# Patient Record
Sex: FEMALE | Race: WHITE | Employment: STUDENT | URBAN - NONMETROPOLITAN AREA
[De-identification: names, ages, dates, MRNs, and addresses within clinical notes are randomized per-mention and may not be internally consistent; named-entity substitution may affect disease eponyms.]

---
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
## 2023-02-14 RX ORDER — SERTRALINE HYDROCHLORIDE 100 MG/1
TABLET, FILM COATED ORAL
Qty: 90 TABLET | OUTPATIENT
Start: 2023-02-14

## 2023-02-14 NOTE — TELEPHONE ENCOUNTER
Patient needs a medication check. Please call and schedule. If she has been on this medication and will not have enough to last her til then, I will send a refill. Please let me know, thanks!

## 2023-02-16 RX ORDER — SERTRALINE HYDROCHLORIDE 100 MG/1
TABLET, FILM COATED ORAL
Qty: 90 TABLET | Refills: 0 | Status: SHIPPED | OUTPATIENT
Start: 2023-02-16

## 2023-02-20 ENCOUNTER — TELEPHONE (OUTPATIENT)
Dept: FAMILY MEDICINE CLINIC | Age: 18
End: 2023-02-20

## 2023-03-01 RX ORDER — SERTRALINE HYDROCHLORIDE 100 MG/1
TABLET, FILM COATED ORAL
Qty: 90 TABLET | Refills: 0 | Status: CANCELLED | OUTPATIENT
Start: 2023-03-01

## 2023-03-01 NOTE — TELEPHONE ENCOUNTER
----- Message from Joselito Griffin sent at 3/1/2023  8:48 AM EST -----  Subject: Refill Request    QUESTIONS  Name of Medication? sertraline (ZOLOFT) 100 MG tablet  Patient-reported dosage and instructions? 100 MG  How many days do you have left? 45  Preferred Pharmacy? [facility] [id number]  Pharmacy phone number (if available)? [phone]  Additional Information for Provider? Pt of Dr. Cresencio Sheehan called to request   refill on medication. Prefers [address] [place] [address].  ---------------------------------------------------------------------------  --------------  Luis Felipe Vale INFO  What is the best way for the office to contact you? OK to leave message on   voicemail  Preferred Call Back Phone Number? [phone]  ---------------------------------------------------------------------------  --------------  SCRIPT ANSWERS  Relationship to Patient?  Self

## 2023-03-01 NOTE — TELEPHONE ENCOUNTER
Can you call and see which script patient needs. 100 mg was sent to the pharmacy on 2/15. Unclear if she needs the same  refilled. Thanks!

## 2023-03-07 NOTE — TELEPHONE ENCOUNTER
Pt scheduled for Med check tomorrow with Dr. Aurelia Hammond. Would like to discuss transferring care to [facility] Provider.

## 2023-03-08 ENCOUNTER — OFFICE VISIT (OUTPATIENT)
Dept: FAMILY MEDICINE CLINIC | Age: 18
End: 2023-03-08
Payer: COMMERCIAL

## 2023-03-08 VITALS
WEIGHT: 142 LBS | DIASTOLIC BLOOD PRESSURE: 75 MMHG | OXYGEN SATURATION: 97 % | HEIGHT: 62 IN | HEART RATE: 79 BPM | SYSTOLIC BLOOD PRESSURE: 116 MMHG | RESPIRATION RATE: 19 BRPM | BODY MASS INDEX: 26.13 KG/M2 | TEMPERATURE: 97.6 F

## 2023-03-08 DIAGNOSIS — F32.A DEPRESSION, UNSPECIFIED DEPRESSION TYPE: ICD-10-CM

## 2023-03-08 DIAGNOSIS — F41.9 ANXIETY: Primary | ICD-10-CM

## 2023-03-08 PROBLEM — H69.82 DYSFUNCTION OF LEFT EUSTACHIAN TUBE: Status: RESOLVED | Noted: 2018-11-06 | Resolved: 2023-03-08

## 2023-03-08 PROBLEM — H66.001 NON-RECURRENT ACUTE SUPPURATIVE OTITIS MEDIA OF RIGHT EAR WITHOUT SPONTANEOUS RUPTURE OF TYMPANIC MEMBRANE: Status: RESOLVED | Noted: 2020-03-11 | Resolved: 2023-03-08

## 2023-03-08 PROBLEM — H69.92 DYSFUNCTION OF LEFT EUSTACHIAN TUBE: Status: RESOLVED | Noted: 2018-11-06 | Resolved: 2023-03-08

## 2023-03-08 PROCEDURE — 99214 OFFICE O/P EST MOD 30 MIN: CPT | Performed by: PEDIATRICS

## 2023-03-08 RX ORDER — SERTRALINE HYDROCHLORIDE 100 MG/1
TABLET, FILM COATED ORAL
Qty: 90 TABLET | Refills: 3 | Status: SHIPPED | OUTPATIENT
Start: 2023-03-08

## 2023-03-08 ASSESSMENT — PATIENT HEALTH QUESTIONNAIRE - PHQ9
SUM OF ALL RESPONSES TO PHQ QUESTIONS 1-9: 10
SUM OF ALL RESPONSES TO PHQ9 QUESTIONS 1 & 2: 3
10. IF YOU CHECKED OFF ANY PROBLEMS, HOW DIFFICULT HAVE THESE PROBLEMS MADE IT FOR YOU TO DO YOUR WORK, TAKE CARE OF THINGS AT HOME, OR GET ALONG WITH OTHER PEOPLE: 1
3. TROUBLE FALLING OR STAYING ASLEEP: 1
8. MOVING OR SPEAKING SO SLOWLY THAT OTHER PEOPLE COULD HAVE NOTICED. OR THE OPPOSITE, BEING SO FIGETY OR RESTLESS THAT YOU HAVE BEEN MOVING AROUND A LOT MORE THAN USUAL: 1
SUM OF ALL RESPONSES TO PHQ QUESTIONS 1-9: 10
5. POOR APPETITE OR OVEREATING: 1
9. THOUGHTS THAT YOU WOULD BE BETTER OFF DEAD, OR OF HURTING YOURSELF: 0
SUM OF ALL RESPONSES TO PHQ QUESTIONS 1-9: 10
6. FEELING BAD ABOUT YOURSELF - OR THAT YOU ARE A FAILURE OR HAVE LET YOURSELF OR YOUR FAMILY DOWN: 1
SUM OF ALL RESPONSES TO PHQ QUESTIONS 1-9: 10
4. FEELING TIRED OR HAVING LITTLE ENERGY: 1
2. FEELING DOWN, DEPRESSED OR HOPELESS: 1
1. LITTLE INTEREST OR PLEASURE IN DOING THINGS: 2
7. TROUBLE CONCENTRATING ON THINGS, SUCH AS READING THE NEWSPAPER OR WATCHING TELEVISION: 2

## 2023-03-08 ASSESSMENT — ENCOUNTER SYMPTOMS
GASTROINTESTINAL NEGATIVE: 1
RESPIRATORY NEGATIVE: 1

## 2023-03-08 NOTE — PROGRESS NOTES
ASSESSMENT:         1. Anxiety    2. Depression, unspecified depression type      Doing well on SSRI, no side effects of concern, no safety concerns     PLAN:     Have a set bedtime and routine   Bedtime and wake up time should be about the same time on school and non-school nights   Make the hour before bed shared quiet time, avoid high-energy or stimulating activities (TV, tablet, smartphone) just before bed   Avoid product containing caffeine in the evening (soda, coffee, tea, chocolate)   Regular exercise outside in daylight whenever possible   Use bed only for sleeping   No television in the bedroom     Would like to continue Zoloft 100 mg   Can consider weaning -would like to hold off until the summer   Monitor closely for medication effectiveness, duration, and side effects of concern. Return in 3-6 months for medication followup and monitoring of growth chart, sooner w/ academic or behavior concerns, immediately w/ safety concerns. More than 30 min spent in record review, patient face to face time with history, exam and coordination of care of care      Shawnmarvin Reis was seen today for other. Diagnoses and all orders for this visit:    Anxiety    Depression, unspecified depression type    Other orders  -     sertraline (ZOLOFT) 100 MG tablet; TAKE ONE TABLET BY MOUTH DAILY        No follow-ups on file.     SUBJECTIVE:      Chief Complaint   Patient presents with    Other     Med check, no concerns        HPI: Valaria Severe is a 21 y.o. female here today for medication check for anxiety     Medication: Zoloft 100 mg    Adverse Effects:  denies    Compliance:  good     Appetite:  no changes     Sleep:  7.5 hours a night, does nap during the day     Academics: freshman year at [employer], major in marketing, might switch to hospitality     Harmful thoughts:  no safety concerns     Counseling :  no, looking into seeing someone on campus     Social: started work at [employer] for the past couple weeks, trying to get into a routine       /75 (Site: Right Upper Arm, Position: Sitting, Cuff Size: Child)   Pulse 79   Temp 97.6 °F (36.4 °C) (Temporal)   Resp 19   Ht 5' 2\" (1.575 m)   Wt 142 lb (64.4 kg)   SpO2 97%   BMI 25.97 kg/m²     Allergies   Allergen Reactions    Latex Rash    Penicillins Hives       No current outpatient medications on file prior to visit. No current facility-administered medications on file prior to visit. Past Medical History:   Diagnosis Date    Dysfunction of left eustachian tube 11/6/2018    Mononucleosis 2015    Non-recurrent acute suppurative otitis media of right ear without spontaneous rupture of tympanic membrane 3/11/2020       Family History   Problem Relation Age of Onset    No Known Problems Mother     No Known Problems Father        Review of Systems   Constitutional: Negative. HENT: Negative. Respiratory: Negative. Cardiovascular: Negative. Gastrointestinal: Negative. Psychiatric/Behavioral:  The patient is nervous/anxious. OBJECTIVE:         Physical Exam  Vitals and nursing note reviewed. Constitutional:       General: She is not in acute distress. Appearance: She is well-developed. HENT:      Nose: Nose normal.   Eyes:      Conjunctiva/sclera: Conjunctivae normal.      Pupils: Pupils are equal, round, and reactive to light. Cardiovascular:      Rate and Rhythm: Normal rate and regular rhythm. Heart sounds: Normal heart sounds. Pulmonary:      Effort: Pulmonary effort is normal.      Breath sounds: Normal breath sounds. Abdominal:      Palpations: Abdomen is soft. Tenderness: There is no abdominal tenderness. Musculoskeletal:      Cervical back: Neck supple. Lymphadenopathy:      Cervical: No cervical adenopathy. Skin:     General: Skin is warm and dry. Coloration: Skin is not pale. Findings: No erythema or rash.

## 2024-03-06 ENCOUNTER — HOSPITAL ENCOUNTER (OUTPATIENT)
Age: 19
Discharge: HOME | End: 2024-03-06
Payer: COMMERCIAL

## 2024-03-06 ENCOUNTER — OFFICE VISIT (OUTPATIENT)
Dept: INTERNAL MEDICINE CLINIC | Age: 19
End: 2024-03-06
Payer: COMMERCIAL

## 2024-03-06 VITALS
DIASTOLIC BLOOD PRESSURE: 70 MMHG | OXYGEN SATURATION: 99 % | WEIGHT: 148 LBS | HEART RATE: 69 BPM | SYSTOLIC BLOOD PRESSURE: 122 MMHG | HEIGHT: 62 IN | BODY MASS INDEX: 27.23 KG/M2

## 2024-03-06 DIAGNOSIS — N94.6 DYSMENORRHEA: ICD-10-CM

## 2024-03-06 DIAGNOSIS — M25.532 PAIN OF BOTH WRIST JOINTS: ICD-10-CM

## 2024-03-06 DIAGNOSIS — Z83.2 FAMILY HISTORY OF AUTOIMMUNE DISORDER: ICD-10-CM

## 2024-03-06 DIAGNOSIS — F41.9 ANXIETY: ICD-10-CM

## 2024-03-06 DIAGNOSIS — Z76.89 ENCOUNTER TO ESTABLISH CARE: Primary | ICD-10-CM

## 2024-03-06 DIAGNOSIS — R53.82 CHRONIC FATIGUE: ICD-10-CM

## 2024-03-06 DIAGNOSIS — M25.531 PAIN OF BOTH WRIST JOINTS: ICD-10-CM

## 2024-03-06 DIAGNOSIS — F32.89 OTHER DEPRESSION: ICD-10-CM

## 2024-03-06 LAB
25(OH)D3 SERPL-MCNC: 18.94 NG/ML
ALP BLD-CCNC: 86 IU/L (ref 40–128)
ALT SERPL-CCNC: 10 U/L (ref 10–40)
ANION GAP SERPL CALCULATED.3IONS-SCNC: 11 MMOL/L (ref 7–16)
AST SERPL-CCNC: 16 IU/L (ref 15–37)
BASOPHILS ABSOLUTE: 0.1 K/CU MM
BASOPHILS RELATIVE PERCENT: 1.1 % (ref 0–1)
BUN SERPL-MCNC: 4 MG/DL (ref 6–23)
CALCIUM SERPL-MCNC: 9.4 MG/DL (ref 8.3–10.6)
CHLORIDE BLD-SCNC: 103 MMOL/L (ref 99–110)
CO2: 26 MMOL/L (ref 21–32)
CREAT SERPL-MCNC: 0.6 MG/DL (ref 0.6–1.1)
CRP SERPL HS-MCNC: 3 MG/L
DIFFERENTIAL TYPE: ABNORMAL
EOSINOPHILS ABSOLUTE: 0.1 K/CU MM
EOSINOPHILS RELATIVE PERCENT: 1.5 % (ref 0–3)
ERYTHROCYTE SEDIMENTATION RATE: 1 MM/HR (ref 0–20)
ESTIMATED AVERAGE GLUCOSE: 105 MG/DL
GFR SERPL CREATININE-BSD FRML MDRD: >60 ML/MIN/1.73M2
HBA1C MFR BLD: 5.3 % (ref 4.2–6.3)
HCT VFR BLD CALC: 42.7 % (ref 37–47)
HEMOGLOBIN: 13.3 GM/DL (ref 12.5–16)
IMMATURE NEUTROPHIL %: 0.2 % (ref 0–0.43)
LYMPHOCYTES ABSOLUTE: 2.8 K/CU MM
LYMPHOCYTES RELATIVE PERCENT: 41.7 % (ref 24–44)
MCH RBC QN AUTO: 27.9 PG (ref 27–31)
MCHC RBC AUTO-ENTMCNC: 31.1 % (ref 32–36)
MCV RBC AUTO: 89.5 FL (ref 78–100)
MONOCYTES ABSOLUTE: 0.7 K/CU MM
MONOCYTES RELATIVE PERCENT: 9.9 % (ref 0–4)
NUCLEATED RBC %: 0 %
PDW BLD-RTO: 12.3 % (ref 11.7–14.9)
PLATELET # BLD: 417 K/CU MM (ref 140–440)
PMV BLD AUTO: 10.4 FL (ref 7.5–11.1)
RBC # BLD: 4.77 M/CU MM (ref 4.2–5.4)
SEGMENTED NEUTROPHILS ABSOLUTE COUNT: 3 K/CU MM
SEGMENTED NEUTROPHILS RELATIVE PERCENT: 45.6 % (ref 36–66)
SODIUM BLD-SCNC: 140 MMOL/L (ref 135–145)
T4 FREE SERPL-MCNC: 1.16 NG/DL (ref 0.9–1.8)
TOTAL IMMATURE NEUTOROPHIL: 0.01 K/CU MM
TOTAL NUCLEATED RBC: 0 K/CU MM
TOTAL PROTEIN: 7 GM/DL (ref 6.4–8.2)
TSH SERPL DL<=0.005 MIU/L-ACNC: 1.01 UIU/ML (ref 0.27–4.2)
WBC # BLD: 6.7 K/CU MM (ref 4–10.5)

## 2024-03-06 PROCEDURE — 82306 VITAMIN D 25 HYDROXY: CPT

## 2024-03-06 PROCEDURE — 99203 OFFICE O/P NEW LOW 30 MIN: CPT

## 2024-03-06 PROCEDURE — 86225 DNA ANTIBODY NATIVE: CPT

## 2024-03-06 PROCEDURE — 85025 COMPLETE CBC W/AUTO DIFF WBC: CPT

## 2024-03-06 PROCEDURE — 86038 ANTINUCLEAR ANTIBODIES: CPT

## 2024-03-06 PROCEDURE — 84439 ASSAY OF FREE THYROXINE: CPT

## 2024-03-06 PROCEDURE — 85652 RBC SED RATE AUTOMATED: CPT

## 2024-03-06 PROCEDURE — 36415 COLL VENOUS BLD VENIPUNCTURE: CPT

## 2024-03-06 PROCEDURE — 80053 COMPREHEN METABOLIC PANEL: CPT

## 2024-03-06 PROCEDURE — 83036 HEMOGLOBIN GLYCOSYLATED A1C: CPT

## 2024-03-06 PROCEDURE — 86140 C-REACTIVE PROTEIN: CPT

## 2024-03-06 PROCEDURE — 84443 ASSAY THYROID STIM HORMONE: CPT

## 2024-03-06 RX ORDER — DROSPIRENONE AND ESTETROL 3-14.2(28)
1 KIT ORAL DAILY
COMMUNITY
Start: 2023-12-27

## 2024-03-06 RX ORDER — SERTRALINE HYDROCHLORIDE 100 MG/1
TABLET, FILM COATED ORAL
Qty: 90 TABLET | Refills: 3 | Status: SHIPPED | OUTPATIENT
Start: 2024-03-06

## 2024-03-06 SDOH — ECONOMIC STABILITY: INCOME INSECURITY: HOW HARD IS IT FOR YOU TO PAY FOR THE VERY BASICS LIKE FOOD, HOUSING, MEDICAL CARE, AND HEATING?: NOT HARD AT ALL

## 2024-03-06 SDOH — ECONOMIC STABILITY: FOOD INSECURITY: WITHIN THE PAST 12 MONTHS, THE FOOD YOU BOUGHT JUST DIDN'T LAST AND YOU DIDN'T HAVE MONEY TO GET MORE.: NEVER TRUE

## 2024-03-06 SDOH — ECONOMIC STABILITY: FOOD INSECURITY: WITHIN THE PAST 12 MONTHS, YOU WORRIED THAT YOUR FOOD WOULD RUN OUT BEFORE YOU GOT MONEY TO BUY MORE.: NEVER TRUE

## 2024-03-06 SDOH — ECONOMIC STABILITY: HOUSING INSECURITY
IN THE LAST 12 MONTHS, WAS THERE A TIME WHEN YOU DID NOT HAVE A STEADY PLACE TO SLEEP OR SLEPT IN A SHELTER (INCLUDING NOW)?: NO

## 2024-03-06 ASSESSMENT — PATIENT HEALTH QUESTIONNAIRE - PHQ9
6. FEELING BAD ABOUT YOURSELF - OR THAT YOU ARE A FAILURE OR HAVE LET YOURSELF OR YOUR FAMILY DOWN: 1
SUM OF ALL RESPONSES TO PHQ9 QUESTIONS 1 & 2: 6
7. TROUBLE CONCENTRATING ON THINGS, SUCH AS READING THE NEWSPAPER OR WATCHING TELEVISION: 1
10. IF YOU CHECKED OFF ANY PROBLEMS, HOW DIFFICULT HAVE THESE PROBLEMS MADE IT FOR YOU TO DO YOUR WORK, TAKE CARE OF THINGS AT HOME, OR GET ALONG WITH OTHER PEOPLE: 0
3. TROUBLE FALLING OR STAYING ASLEEP: 2
SUM OF ALL RESPONSES TO PHQ QUESTIONS 1-9: 14
1. LITTLE INTEREST OR PLEASURE IN DOING THINGS: 3
9. THOUGHTS THAT YOU WOULD BE BETTER OFF DEAD, OR OF HURTING YOURSELF: 0
5. POOR APPETITE OR OVEREATING: 1
2. FEELING DOWN, DEPRESSED OR HOPELESS: 3
SUM OF ALL RESPONSES TO PHQ QUESTIONS 1-9: 14
8. MOVING OR SPEAKING SO SLOWLY THAT OTHER PEOPLE COULD HAVE NOTICED. OR THE OPPOSITE, BEING SO FIGETY OR RESTLESS THAT YOU HAVE BEEN MOVING AROUND A LOT MORE THAN USUAL: 1
4. FEELING TIRED OR HAVING LITTLE ENERGY: 2

## 2024-03-06 NOTE — PROGRESS NOTES
Subjective:      Chief Complaint   Patient presents with    New Patient     HPI:  Jessica Motley is a 18 y.o. female who presents today for establishing care as a new patient.    Applying to [employer] program, will hear this month.     Taking zoloft since 8th grade, no adverse side effects. Has been on current dose for a long time and seems to control symptoms well.     Seeing [facility] in [place] [facility]. Started on [employer].    Has been feeling similar symptoms to her mom when she was diagnosed with hashimoto's. Dizzy spells. Has been evaluated with cardiology was told to increase protein and salt in diet.     Has dysmenorrhea and periods are severe. Usually week before period is the worst.     Harder to hear out of left ear.     Mono diagnosed in 6th grade.     Family hx: thyroid (hashimoto's)    Past Medical History:   Diagnosis Date    Dysfunction of left eustachian tube 11/6/2018    Mononucleosis 2015    Non-recurrent acute suppurative otitis media of right ear without spontaneous rupture of tympanic membrane 3/11/2020      Past Surgical History:   Procedure Laterality Date    TONSILLECTOMY AND ADENOIDECTOMY  2006     Social History     Tobacco Use    Smoking status: Never    Smokeless tobacco: Never   Substance Use Topics    Alcohol use: Not on file      Review of Systems   Constitutional:  Negative for activity change, appetite change, chills, diaphoresis, fatigue, fever and unexpected weight change.   HENT:  Negative for congestion, facial swelling, rhinorrhea, sore throat and trouble swallowing.    Eyes:  Negative for pain, discharge, redness and visual disturbance.   Respiratory:  Negative for cough, choking, chest tightness, shortness of breath, wheezing and stridor.    Cardiovascular:  Negative for chest pain, palpitations and leg swelling.   Gastrointestinal:  Negative for abdominal pain, constipation, diarrhea, nausea and vomiting.   Genitourinary:  Negative for difficulty

## 2024-03-09 LAB — DSDNA IGG SER QL IA: 3 IU (ref 0–24)

## 2024-03-10 LAB
ANTINUCLEAR ANTIBODY, HEP-2, IGG: NORMAL
INTERPRETATION: NORMAL

## 2024-03-11 DIAGNOSIS — E55.9 VITAMIN D DEFICIENCY: Primary | ICD-10-CM

## 2024-03-11 RX ORDER — ERGOCALCIFEROL 1.25 MG/1
50000 CAPSULE ORAL WEEKLY
Qty: 12 CAPSULE | Refills: 1 | Status: SHIPPED | OUTPATIENT
Start: 2024-03-11

## 2024-05-20 ENCOUNTER — TELEPHONE (OUTPATIENT)
Dept: INTERNAL MEDICINE CLINIC | Age: 19
End: 2024-05-20

## 2024-05-20 NOTE — TELEPHONE ENCOUNTER
----- Message from Cruzito Fuentes sent at 5/20/2024 10:28 AM EDT -----  Regarding: [facility] Referral Request  [facility] Referral Request    Reason for referral request: Specialty Provider    Specialist/Lab/Test patient is requesting (if known): Psychologist     Specialist Phone Number (if applicable):    Additional Information Pt wants to have a referral regarding with her to see a psychologist  --------------------------------------------------------------------------------------------------------------------------    Relationship to Patient: Self     Call Back Information: OK to leave message on voicemail  Preferred Call Back Number: Phone [phone]

## 2024-05-20 NOTE — TELEPHONE ENCOUNTER
Got a call from the patient regarding immunizations she is needing for school, informed her the only vaccination done in the office is the flue vaccine. Informed her to contact health department, said she will give them a call.    Patient also asked if pcp could place referral for psychologist. Stated they spoke about it a little at her last appointment.

## 2024-06-27 DIAGNOSIS — Z83.49 FAMILY HISTORY OF THYROID DISEASE: ICD-10-CM

## 2024-06-27 DIAGNOSIS — Z83.2 FAMILY HISTORY OF AUTOIMMUNE DISORDER: Primary | ICD-10-CM

## 2024-06-27 DIAGNOSIS — R53.82 CHRONIC FATIGUE: ICD-10-CM

## 2024-07-01 ENCOUNTER — HOSPITAL ENCOUNTER (OUTPATIENT)
Dept: ULTRASOUND IMAGING | Age: 19
Discharge: HOME | End: 2024-07-01
Payer: COMMERCIAL

## 2024-07-01 DIAGNOSIS — Z83.2 FAMILY HISTORY OF AUTOIMMUNE DISORDER: ICD-10-CM

## 2024-07-01 DIAGNOSIS — Z83.49 FAMILY HISTORY OF THYROID DISEASE: ICD-10-CM

## 2024-07-01 DIAGNOSIS — R53.82 CHRONIC FATIGUE: ICD-10-CM

## 2024-07-01 PROCEDURE — 76536 US EXAM OF HEAD AND NECK: CPT

## 2024-07-23 ENCOUNTER — PATIENT MESSAGE (OUTPATIENT)
Dept: INTERNAL MEDICINE CLINIC | Age: 19
End: 2024-07-23

## 2024-07-23 ENCOUNTER — OFFICE VISIT (OUTPATIENT)
Dept: INTERNAL MEDICINE CLINIC | Age: 19
End: 2024-07-23
Payer: COMMERCIAL

## 2024-07-23 VITALS
SYSTOLIC BLOOD PRESSURE: 100 MMHG | BODY MASS INDEX: 26.7 KG/M2 | WEIGHT: 146 LBS | HEART RATE: 73 BPM | OXYGEN SATURATION: 98 % | DIASTOLIC BLOOD PRESSURE: 64 MMHG

## 2024-07-23 DIAGNOSIS — R41.840 IMPAIRED CONCENTRATION: ICD-10-CM

## 2024-07-23 DIAGNOSIS — F32.89 OTHER DEPRESSION: ICD-10-CM

## 2024-07-23 DIAGNOSIS — F41.9 ANXIETY: Primary | ICD-10-CM

## 2024-07-23 PROCEDURE — 99213 OFFICE O/P EST LOW 20 MIN: CPT

## 2024-07-23 ASSESSMENT — ENCOUNTER SYMPTOMS
DIARRHEA: 0
RHINORRHEA: 0
COUGH: 0
STRIDOR: 0
CONSTIPATION: 0
CHOKING: 0
VOMITING: 0
SORE THROAT: 0
SHORTNESS OF BREATH: 0
NAUSEA: 0
FACIAL SWELLING: 0
COLOR CHANGE: 0
WHEEZING: 0
TROUBLE SWALLOWING: 0
ABDOMINAL PAIN: 0
CHEST TIGHTNESS: 0
EYE PAIN: 0
EYE REDNESS: 0
EYE DISCHARGE: 0

## 2024-07-23 ASSESSMENT — VISUAL ACUITY: OU: 1

## 2024-07-23 NOTE — PROGRESS NOTES
Neurological:  Negative for dizziness, syncope, weakness, light-headedness, numbness and headaches.   Psychiatric/Behavioral:  Positive for decreased concentration and dysphoric mood. Negative for agitation and behavioral problems. The patient is nervous/anxious.       Prior to Visit Medications    Medication Sig Taking? Authorizing Provider   vitamin D (ERGOCALCIFEROL) 1.25 MG (90507 UT) CAPS capsule Take 1 capsule by mouth once a week Yes Valerie Garay APRN - CNP   sertraline (ZOLOFT) 100 MG tablet TAKE ONE TABLET BY MOUTH DAILY Yes Valerie Garay APRN - CNP   NEXTSTELLIS 3-14.2 MG TABS Take 1 tablet by mouth daily Yes Provider, MD Kendall        Objective:      /64   Pulse 73   Wt 66.2 kg (146 lb)   SpO2 98%   BMI 26.70 kg/m²    Physical Exam  Vitals reviewed.   Constitutional:       General: She is awake. She is not in acute distress.     Appearance: Normal appearance. She is well-developed. She is not ill-appearing or toxic-appearing.   HENT:      Head: Normocephalic.      Jaw: There is normal jaw occlusion.      Right Ear: Tympanic membrane, ear canal and external ear normal.      Left Ear: Tympanic membrane, ear canal and external ear normal.      Nose: Nose normal.      Mouth/Throat:      Mouth: Mucous membranes are moist.      Pharynx: Oropharynx is clear.   Eyes:      General: Lids are normal. Vision grossly intact. Gaze aligned appropriately.      Extraocular Movements: Extraocular movements intact.      Conjunctiva/sclera: Conjunctivae normal.      Pupils: Pupils are equal, round, and reactive to light.   Cardiovascular:      Rate and Rhythm: Normal rate and regular rhythm.      Pulses: Normal pulses.      Heart sounds: Normal heart sounds, S1 normal and S2 normal.   Pulmonary:      Effort: Pulmonary effort is normal. No respiratory distress.      Breath sounds: Normal breath sounds.   Abdominal:      General: Bowel sounds are normal.      Palpations: Abdomen is soft.      Tenderness:

## 2024-08-31 DIAGNOSIS — E55.9 VITAMIN D DEFICIENCY: ICD-10-CM

## 2024-09-03 RX ORDER — ERGOCALCIFEROL 1.25 MG/1
50000 CAPSULE, LIQUID FILLED ORAL WEEKLY
Qty: 12 CAPSULE | Refills: 1 | Status: SHIPPED | OUTPATIENT
Start: 2024-09-03

## 2024-09-30 ENCOUNTER — OFFICE VISIT (OUTPATIENT)
Dept: PSYCHOLOGY | Age: 19
End: 2024-09-30
Payer: COMMERCIAL

## 2024-09-30 DIAGNOSIS — F90.2 ATTENTION DEFICIT HYPERACTIVITY DISORDER (ADHD), COMBINED TYPE: ICD-10-CM

## 2024-09-30 DIAGNOSIS — F33.0 MILD EPISODE OF RECURRENT MAJOR DEPRESSIVE DISORDER (HCC): Primary | ICD-10-CM

## 2024-09-30 PROCEDURE — 90791 PSYCH DIAGNOSTIC EVALUATION: CPT | Performed by: PSYCHOLOGIST

## 2024-09-30 NOTE — PROGRESS NOTES
Behavioral Health Consultation  Patient seen by Ingrid Vargas, Psychology Trainee  under the training supervision of Andrew Lucero Psy.D.    Time spent with Patient: 35 minutes  Visit number: 1  Reason for Consult:  depression and anxiety  Referring Provider: No referring provider defined for this encounter.    Informed consent:  Pt provided informed consent for the behavioral health program. Discussed with patient model of service to include the limits of confidentiality (i.e. abuse reporting, suicide intervention, etc.) and focused intervention approach. Pt indicated understanding.    S:  ----------------------------------------------------------------------------------------------------------------------    Presenting problem:  depression and anxiety  Pt reported to session after her parents suggested Pt go to therapy as many of her family members experience MH struggles. Pt reported sleeping and eating \"a lot\" - 9hours at night and 1-3hr nap in the afternoon. Reported a history of anger issues but denied struggles currently. Her current anger triggers are when noises do not coincide well (frequency, sound, volume). Pt reported moments where she paces around her room and talks through hypothetical and past experiences as an outlet to relax. She reported being oriented x4 when she is acting out the experiences. She reported remaining emotionally and physically stressed when she does not act out experiences but has the urge to. She reported her urge act out experiences can also be alleviated by physical activity.    Social:   Pt reported growing up with bio mom, dad, brother (3years younger). She described having \"okay\" relationships with her family (clashing heads with mom and not getting along with brother). She reported being closest with dad. Pt reported that when she finds a new hobby, her mom becomes more upset and \"takes it as a personal slight\". Reported brother was diagnosed with ASD and Pt finds

## 2024-10-14 ENCOUNTER — OFFICE VISIT (OUTPATIENT)
Dept: PSYCHOLOGY | Age: 19
End: 2024-10-14
Payer: COMMERCIAL

## 2024-10-14 DIAGNOSIS — F90.2 ATTENTION DEFICIT HYPERACTIVITY DISORDER (ADHD), COMBINED TYPE: ICD-10-CM

## 2024-10-14 DIAGNOSIS — F33.0 MILD EPISODE OF RECURRENT MAJOR DEPRESSIVE DISORDER (HCC): Primary | ICD-10-CM

## 2024-10-14 PROCEDURE — 90832 PSYTX W PT 30 MINUTES: CPT | Performed by: PSYCHOLOGIST

## 2024-10-14 NOTE — PROGRESS NOTES
oriented to person, place, time, and general circumstances  Appearance and behavior:  alert, cooperative, mild distress  Speech:  normal rate, normal volume, and well articulated  Mood: anxious   Thought Content:  helplessness, worthlessness, and intrusive thoughts  Thought Process:  linear, goal directed, and coherent  Interest/Pleasure: Loss of Pleasure/Fun  Sleep disturbance: Yes  Motivation: Poor  Energy: Tired/Fatigued  Morbid ideation No  Suicide Assessment: no suicidal ideation        3/6/2024     2:22 PM 3/8/2023     3:02 PM 9/21/2021     9:20 AM 3/10/2020     4:26 PM 2/18/2020     3:19 PM 11/18/2019     2:12 PM 8/6/2019     2:18 PM   PHQ Scores   PHQ2 Score 6 3 3 0 2 2 2   PHQ9 Score 14 10 17 0 12 13 13     Interpretation of Total Score Depression Severity: 1-4 = Minimal depression, 5-9 = Mild depression, 10-14 = Moderate depression, 15-19 = Moderately severe depression, 20-27 = Severe depression         No data to display              Interpretation of LYSSA-7 score: 5-9 = mild anxiety, 10-14 = moderate anxiety, 15+ = severe anxiety. Recommend referral to behavioral health for scores 10 or greater.     A:  ----------------------------------------------------------------------------------------------------------------------  Diagnosis:    1. Mild episode of recurrent major depressive disorder (HCC)    2. Attention deficit hyperactivity disorder (ADHD), combined type       P:  ----------------------------------------------------------------------------------------------------------------------     [x]Discussed potential treatments for   1. Mild episode of recurrent major depressive disorder (HCC)    2. Attention deficit hyperactivity disorder (ADHD), combined type       [x]Conducted functional assessment  [x]Established rapport  [x]Supportive interventions   [x][place]-setting to identify pt's primary goals for [facility] visit / overall health  [x]Provided psychoeducation/handout on   1. Mild episode of recurrent

## 2024-10-28 ENCOUNTER — OFFICE VISIT (OUTPATIENT)
Dept: PSYCHOLOGY | Age: 19
End: 2024-10-28
Payer: COMMERCIAL

## 2024-10-28 DIAGNOSIS — F33.0 MILD EPISODE OF RECURRENT MAJOR DEPRESSIVE DISORDER (HCC): Primary | ICD-10-CM

## 2024-10-28 DIAGNOSIS — F90.2 ATTENTION DEFICIT HYPERACTIVITY DISORDER (ADHD), COMBINED TYPE: ICD-10-CM

## 2024-10-28 PROCEDURE — 90832 PSYTX W PT 30 MINUTES: CPT | Performed by: PSYCHOLOGIST

## 2024-10-28 NOTE — PROGRESS NOTES
Behavioral Health Consultation  Patient seen by Ingrid Vargas, Psychology Trainee  under the training supervision of Andrew Lucero Psy.D.    Time spent with Patient: 30 minutes  Visit number: 3  Reason for Consult:  depression and anxiety  Referring Provider: Valerie Garay APRN - CNP  No address on file    Informed consent:  Pt provided informed consent for the behavioral health program. Discussed with patient model of service to include the limits of confidentiality (i.e. abuse reporting, suicide intervention, etc.) and focused intervention approach. Pt indicated understanding.    S:  ----------------------------------------------------------------------------------------------------------------------    Presenting problem:  depression and anxiety  Pt explained she had a weekend of ups and downs. She went to [place], [place] with her family to visit [place] in [place]. She described not enjoying her time on the trip because she was uncomfortable with her family and anxious in crowds. Additionally, she said her brother was intentionally irritating Pt, which frustrated Pt. After vocalizing her frustration with her brother, Pt noted her mom stepped in and criticized Pt for voicing her frustration. Pt explained she received validation and support from father, which made Pt feel better. She explained her family dog ran away while the family was on the trip, which cut the trip short. Pt reported she had a panic attack in the car on the way home, worried her family dog may have been killed by a car. She explained her physical symptoms stopped once her dog returned back to the family home. She described sleeping with her dog that night and was able to sleep in.     Pt endorsed depressed mood, poor sleep patterns, poor diet, loss of pleasure in previous hobbies, trouble sleeping, and excessive worry about judgement from others.

## 2024-11-11 ENCOUNTER — OFFICE VISIT (OUTPATIENT)
Dept: PSYCHOLOGY | Age: 19
End: 2024-11-11
Payer: COMMERCIAL

## 2024-11-11 DIAGNOSIS — F90.2 ATTENTION DEFICIT HYPERACTIVITY DISORDER (ADHD), COMBINED TYPE: ICD-10-CM

## 2024-11-11 DIAGNOSIS — F33.0 MILD EPISODE OF RECURRENT MAJOR DEPRESSIVE DISORDER (HCC): Primary | ICD-10-CM

## 2024-11-11 PROCEDURE — 90832 PSYTX W PT 30 MINUTES: CPT | Performed by: PSYCHOLOGIST

## 2024-11-25 ENCOUNTER — OFFICE VISIT (OUTPATIENT)
Dept: PSYCHOLOGY | Age: 19
End: 2024-11-25
Payer: COMMERCIAL

## 2024-11-25 DIAGNOSIS — F33.0 MILD EPISODE OF RECURRENT MAJOR DEPRESSIVE DISORDER (HCC): Primary | ICD-10-CM

## 2024-11-25 DIAGNOSIS — F90.2 ATTENTION DEFICIT HYPERACTIVITY DISORDER (ADHD), COMBINED TYPE: ICD-10-CM

## 2024-11-25 PROCEDURE — 90832 PSYTX W PT 30 MINUTES: CPT | Performed by: PSYCHOLOGIST

## 2024-11-25 NOTE — PROGRESS NOTES
Behavioral Health Consultation  Patient seen by Ingrid Vargas, Psychology Trainee  under the training supervision of Andrew Lucero Psy.D.    Time spent with Patient: 30 minutes  Visit number: 5  Reason for Consult:  depression and anxiety  Referring Provider: No referring provider defined for this encounter.    Informed consent:  Pt provided informed consent for the behavioral health program. Discussed with patient model of service to include the limits of confidentiality (i.e. abuse reporting, suicide intervention, etc.) and focused intervention approach. Pt indicated understanding.    S:  ----------------------------------------------------------------------------------------------------------------------    Presenting problem:  depression and anxiety  Pt explained she is getting ready for finals and is feeling very stressed because her grades are close to the threshold of passing. Pt explained she is trying to remind herself to perform the best she can to ensure she passes. Pt described feeling upset that classes have been canceled before Thanksgiving, which she feels frustrated by because she wanted the class to help prepare for finals. To make up for lost class time, Pt explained setting up time with her friends to study for their finals that are the first two weeks of December. Pt described the ways she best studies and plans to utilize her strategies to prepare for finals.    Pt reported her family is planning on going to her maternal grandmother's house for Thanksgiving. She described that her grandmother can be mean, which was evident in a recent shopping trip where her grandmother made rude comments to Pt. Pt discussed strategies she utilizes to prepare for / get through the holidays mentally, emotionally, and physically.    Pt also discussed a friend break up that she had about two years ago. She explained Pt saw the ex-friend recently at the store, which caught Pt off guard. Pt described

## 2024-12-12 ENCOUNTER — TELEPHONE (OUTPATIENT)
Dept: INTERNAL MEDICINE CLINIC | Age: 19
End: 2024-12-12

## 2024-12-12 NOTE — TELEPHONE ENCOUNTER
I'm still unsure of what specific antibody they are wanting her tested for.  There is an antibody screen, but that is generally for pre op for possible transfusion purposes, or for prenatal care (I don't think this is what they are looking for).  Can we please try to get some details, or find the epic order they are wanting? Thanks.

## 2024-12-12 NOTE — TELEPHONE ENCOUNTER
I'm not sure what an immunity blood level test is.  Is this a requirement from the school? If so, please have her obtain the exact lab they are requiring and I can order them- thanks.

## 2024-12-12 NOTE — TELEPHONE ENCOUNTER
Patient is in [employer] and is asking for an order for Immunity Blood Level test. Any questions, please call [phone]

## 2025-01-23 ENCOUNTER — OFFICE VISIT (OUTPATIENT)
Dept: INTERNAL MEDICINE CLINIC | Age: 20
End: 2025-01-23

## 2025-01-23 VITALS
OXYGEN SATURATION: 98 % | DIASTOLIC BLOOD PRESSURE: 68 MMHG | WEIGHT: 155 LBS | SYSTOLIC BLOOD PRESSURE: 102 MMHG | BODY MASS INDEX: 28.35 KG/M2 | HEART RATE: 95 BPM

## 2025-01-23 DIAGNOSIS — Z00.00 GENERAL MEDICAL EXAM: ICD-10-CM

## 2025-01-23 DIAGNOSIS — F90.2 ATTENTION DEFICIT HYPERACTIVITY DISORDER (ADHD), COMBINED TYPE: ICD-10-CM

## 2025-01-23 DIAGNOSIS — F41.9 ANXIETY: ICD-10-CM

## 2025-01-23 DIAGNOSIS — F32.89 OTHER DEPRESSION: Primary | ICD-10-CM

## 2025-01-23 DIAGNOSIS — R76.8 POSITIVE ANA (ANTINUCLEAR ANTIBODY): ICD-10-CM

## 2025-01-23 DIAGNOSIS — E55.9 VITAMIN D DEFICIENCY: ICD-10-CM

## 2025-01-23 RX ORDER — DEXTROAMPHETAMINE SACCHARATE, AMPHETAMINE ASPARTATE MONOHYDRATE, DEXTROAMPHETAMINE SULFATE AND AMPHETAMINE SULFATE 2.5; 2.5; 2.5; 2.5 MG/1; MG/1; MG/1; MG/1
10 CAPSULE, EXTENDED RELEASE ORAL DAILY
Qty: 30 CAPSULE | Refills: 0 | Status: SHIPPED | OUTPATIENT
Start: 2025-01-23 | End: 2025-02-22

## 2025-01-23 RX ORDER — SERTRALINE HYDROCHLORIDE 100 MG/1
TABLET, FILM COATED ORAL
Qty: 90 TABLET | Refills: 3 | Status: SHIPPED | OUTPATIENT
Start: 2025-01-23

## 2025-01-23 SDOH — ECONOMIC STABILITY: FOOD INSECURITY: WITHIN THE PAST 12 MONTHS, YOU WORRIED THAT YOUR FOOD WOULD RUN OUT BEFORE YOU GOT MONEY TO BUY MORE.: NEVER TRUE

## 2025-01-23 SDOH — ECONOMIC STABILITY: FOOD INSECURITY: WITHIN THE PAST 12 MONTHS, THE FOOD YOU BOUGHT JUST DIDN'T LAST AND YOU DIDN'T HAVE MONEY TO GET MORE.: NEVER TRUE

## 2025-01-23 ASSESSMENT — ENCOUNTER SYMPTOMS
DIARRHEA: 0
SORE THROAT: 0
CONSTIPATION: 0
COLOR CHANGE: 0
SHORTNESS OF BREATH: 0
ABDOMINAL PAIN: 0
CHEST TIGHTNESS: 0
VOMITING: 0

## 2025-01-23 NOTE — PROGRESS NOTES
Subjective:      Chief Complaint   Patient presents with    Establish Care     New to provider-former patient of Valerie       HPI:  Jessica Motley is a 19 y.o. female who presents today to establish care with new provider and for management of chronic medical conditions as below.     Depression/anxiety:  Takes zoloft 100mg daily, states symptoms are well controlled with medication.     Has been following with psychology.  States she has been evaluated for ADHD and was advised to follow up with PCP for treatment.  Reports she has had symptoms since childhood but was never evaluated for it.  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      Denies any history of cardiac issues.    Does have some issues with chronic joint pain (mostly ankle, wrist, elbow) for which she had labs completed but was never contacted about results. Is unsure about family history of autoimmune disorders.     Sees gynecology, is taking OCP daily.     Was started in vitamin D supplementation, but has not been taking as she has difficulty remembering a weekly pill.    Otherwise no concerns today.         Past Medical History:   Diagnosis Date    Anxiety     Depression     Dysfunction of left eustachian tube 11/06/2018    Mononucleosis 2015    Non-recurrent acute suppurative otitis media of right ear without spontaneous rupture of tympanic membrane 03/11/2020        Past Surgical History:   Procedure Laterality Date    TONSILLECTOMY AND ADENOIDECTOMY  2006       Social History     Tobacco Use    Smoking status: Never    Smokeless tobacco: Never   Substance Use Topics    Alcohol use: Never        Review of Systems   Constitutional:  Negative for activity change, appetite change, chills, fever and unexpected weight change.   HENT:  Negative for congestion and sore throat.    Respiratory:  Negative for chest tightness and

## 2025-02-20 ENCOUNTER — OFFICE VISIT (OUTPATIENT)
Dept: INTERNAL MEDICINE CLINIC | Age: 20
End: 2025-02-20

## 2025-02-20 VITALS
BODY MASS INDEX: 27.98 KG/M2 | DIASTOLIC BLOOD PRESSURE: 72 MMHG | WEIGHT: 153 LBS | SYSTOLIC BLOOD PRESSURE: 104 MMHG | HEART RATE: 76 BPM | OXYGEN SATURATION: 98 %

## 2025-02-20 DIAGNOSIS — E55.9 VITAMIN D DEFICIENCY: ICD-10-CM

## 2025-02-20 DIAGNOSIS — F90.8 OTHER SPECIFIED ATTENTION DEFICIT HYPERACTIVITY DISORDER (ADHD): Primary | ICD-10-CM

## 2025-02-20 DIAGNOSIS — F32.89 OTHER DEPRESSION: ICD-10-CM

## 2025-02-20 DIAGNOSIS — Z83.49 FAMILY HISTORY OF THYROID DISEASE: ICD-10-CM

## 2025-02-20 ASSESSMENT — PATIENT HEALTH QUESTIONNAIRE - PHQ9
1. LITTLE INTEREST OR PLEASURE IN DOING THINGS: SEVERAL DAYS
8. MOVING OR SPEAKING SO SLOWLY THAT OTHER PEOPLE COULD HAVE NOTICED. OR THE OPPOSITE, BEING SO FIGETY OR RESTLESS THAT YOU HAVE BEEN MOVING AROUND A LOT MORE THAN USUAL: SEVERAL DAYS
9. THOUGHTS THAT YOU WOULD BE BETTER OFF DEAD, OR OF HURTING YOURSELF: SEVERAL DAYS
SUM OF ALL RESPONSES TO PHQ QUESTIONS 1-9: 9
6. FEELING BAD ABOUT YOURSELF - OR THAT YOU ARE A FAILURE OR HAVE LET YOURSELF OR YOUR FAMILY DOWN: SEVERAL DAYS
5. POOR APPETITE OR OVEREATING: SEVERAL DAYS
10. IF YOU CHECKED OFF ANY PROBLEMS, HOW DIFFICULT HAVE THESE PROBLEMS MADE IT FOR YOU TO DO YOUR WORK, TAKE CARE OF THINGS AT HOME, OR GET ALONG WITH OTHER PEOPLE: SOMEWHAT DIFFICULT
4. FEELING TIRED OR HAVING LITTLE ENERGY: SEVERAL DAYS
SUM OF ALL RESPONSES TO PHQ QUESTIONS 1-9: 9
3. TROUBLE FALLING OR STAYING ASLEEP: SEVERAL DAYS
SUM OF ALL RESPONSES TO PHQ9 QUESTIONS 1 & 2: 2
SUM OF ALL RESPONSES TO PHQ QUESTIONS 1-9: 9
SUM OF ALL RESPONSES TO PHQ QUESTIONS 1-9: 8
7. TROUBLE CONCENTRATING ON THINGS, SUCH AS READING THE NEWSPAPER OR WATCHING TELEVISION: SEVERAL DAYS
2. FEELING DOWN, DEPRESSED OR HOPELESS: SEVERAL DAYS

## 2025-02-20 ASSESSMENT — ENCOUNTER SYMPTOMS
SHORTNESS OF BREATH: 0
COLOR CHANGE: 0
CONSTIPATION: 0
SORE THROAT: 0
CHEST TIGHTNESS: 0
VOMITING: 0
DIARRHEA: 0
ABDOMINAL PAIN: 0

## 2025-02-20 NOTE — PROGRESS NOTES
Psychiatric/Behavioral:  Negative for dysphoric mood. The patient is not nervous/anxious.         Prior to Visit Medications    Medication Sig Taking? Authorizing Provider   sertraline (ZOLOFT) 100 MG tablet TAKE ONE TABLET BY MOUTH DAILY  Kandice Cooper MD   amphetamine-dextroamphetamine (ADDERALL XR) 10 MG extended release capsule Take 1 capsule by mouth daily for 30 days. Max Daily Amount: 10 mg  Kandice Cooper MD   NEXTSTELLIS 3-14.2 MG TABS Take 1 tablet by mouth daily  Provider, MD Kendall          Objective:      /72 (Site: Left Upper Arm, Position: Sitting, Cuff Size: Medium Adult)   Pulse 76   Wt 69.4 kg (153 lb)   LMP 01/01/2025   SpO2 98%   BMI 27.98 kg/m²      Physical Exam  Vitals and nursing note reviewed.   Constitutional:       General: She is not in acute distress.     Appearance: Normal appearance. She is not ill-appearing or toxic-appearing.   HENT:      Head: Normocephalic and atraumatic.      Right Ear: External ear normal.      Left Ear: External ear normal.      Nose: Nose normal.      Mouth/Throat:      Pharynx: Oropharynx is clear.   Eyes:      General: No scleral icterus.        Right eye: No discharge.         Left eye: No discharge.      Extraocular Movements: Extraocular movements intact.      Conjunctiva/sclera: Conjunctivae normal.   Cardiovascular:      Rate and Rhythm: Normal rate and regular rhythm.      Heart sounds: Normal heart sounds.   Pulmonary:      Effort: Pulmonary effort is normal.      Breath sounds: Normal breath sounds. No wheezing or rales.   Musculoskeletal:         General: No deformity.      Cervical back: Normal range of motion and neck supple. No rigidity.   Skin:     General: Skin is warm and dry.      Findings: No rash.   Neurological:      General: No focal deficit present.      Mental Status: She is alert. Mental status is at baseline.      Motor: No weakness.   Psychiatric:         Mood and Affect: Mood normal.         Behavior:

## 2025-02-21 ENCOUNTER — HOSPITAL ENCOUNTER (OUTPATIENT)
Age: 20
Discharge: HOME | End: 2025-02-21
Payer: COMMERCIAL

## 2025-02-21 DIAGNOSIS — Z00.00 GENERAL MEDICAL EXAM: ICD-10-CM

## 2025-02-21 DIAGNOSIS — Z83.49 FAMILY HISTORY OF THYROID DISEASE: ICD-10-CM

## 2025-02-21 DIAGNOSIS — E55.9 VITAMIN D DEFICIENCY: ICD-10-CM

## 2025-02-21 DIAGNOSIS — F32.89 OTHER DEPRESSION: ICD-10-CM

## 2025-02-21 LAB
25(OH)D3 SERPL-MCNC: 27.9 NG/ML (ref 30–150)
ALBUMIN SERPL-MCNC: 4 G/DL (ref 3.4–5)
ALBUMIN SERPL-MCNC: 4.1 G/DL (ref 3.4–5)
ALBUMIN/GLOB SERPL: 1.3 {RATIO} (ref 1.1–2.2)
ALBUMIN/GLOB SERPL: 1.4 {RATIO} (ref 1.1–2.2)
ALP SERPL-CCNC: 84 U/L (ref 40–129)
ALP SERPL-CCNC: 85 U/L (ref 40–129)
ALT SERPL-CCNC: 10 U/L (ref 10–40)
ALT SERPL-CCNC: 11 U/L (ref 10–40)
ANION GAP SERPL CALCULATED.3IONS-SCNC: 10 MMOL/L (ref 9–17)
ANION GAP SERPL CALCULATED.3IONS-SCNC: 11 MMOL/L (ref 9–17)
AST SERPL-CCNC: 17 U/L (ref 15–37)
AST SERPL-CCNC: 17 U/L (ref 15–37)
BASOPHILS # BLD: 0.07 K/UL
BASOPHILS NFR BLD: 1 % (ref 0–1)
BILIRUB SERPL-MCNC: 0.4 MG/DL (ref 0–1)
BILIRUB SERPL-MCNC: 0.4 MG/DL (ref 0–1)
BUN SERPL-MCNC: 8 MG/DL (ref 7–20)
BUN SERPL-MCNC: 9 MG/DL (ref 7–20)
CALCIUM SERPL-MCNC: 10 MG/DL (ref 8.3–10.6)
CALCIUM SERPL-MCNC: 9.9 MG/DL (ref 8.3–10.6)
CHLORIDE SERPL-SCNC: 104 MMOL/L (ref 99–110)
CHLORIDE SERPL-SCNC: 105 MMOL/L (ref 99–110)
CHOLEST SERPL-MCNC: 191 MG/DL (ref 125–199)
CO2 SERPL-SCNC: 24 MMOL/L (ref 21–32)
CO2 SERPL-SCNC: 25 MMOL/L (ref 21–32)
CREAT SERPL-MCNC: 0.7 MG/DL (ref 0.6–1.1)
CREAT SERPL-MCNC: 0.8 MG/DL (ref 0.6–1.1)
EOSINOPHIL # BLD: 0.08 K/UL
EOSINOPHILS RELATIVE PERCENT: 1 % (ref 0–3)
ERYTHROCYTE [DISTWIDTH] IN BLOOD BY AUTOMATED COUNT: 13.1 % (ref 11.7–14.9)
GFR, ESTIMATED: >90 ML/MIN/1.73M2
GFR, ESTIMATED: >90 ML/MIN/1.73M2
GLUCOSE SERPL-MCNC: 88 MG/DL (ref 74–99)
GLUCOSE SERPL-MCNC: 89 MG/DL (ref 74–99)
HCT VFR BLD AUTO: 42 % (ref 37–47)
HDLC SERPL-MCNC: 69 MG/DL
HGB BLD-MCNC: 13.1 G/DL (ref 12.5–16)
IMM GRANULOCYTES # BLD AUTO: 0.02 K/UL
IMM GRANULOCYTES NFR BLD: 0 %
LDLC SERPL CALC-MCNC: 97 MG/DL
LYMPHOCYTES NFR BLD: 2.64 K/UL
LYMPHOCYTES RELATIVE PERCENT: 39 % (ref 24–44)
MCH RBC QN AUTO: 27.6 PG (ref 27–31)
MCHC RBC AUTO-ENTMCNC: 31.2 G/DL (ref 32–36)
MCV RBC AUTO: 88.4 FL (ref 78–100)
MONOCYTES NFR BLD: 0.58 K/UL
MONOCYTES NFR BLD: 9 % (ref 0–4)
NEUTROPHILS NFR BLD: 51 % (ref 36–66)
NEUTS SEG NFR BLD: 3.47 K/UL
PLATELET # BLD AUTO: 403 K/UL (ref 140–440)
PMV BLD AUTO: 9.7 FL (ref 7.5–11.1)
POTASSIUM SERPL-SCNC: 4.2 MMOL/L (ref 3.5–5.1)
POTASSIUM SERPL-SCNC: 4.3 MMOL/L (ref 3.5–5.1)
PROT SERPL-MCNC: 6.9 G/DL (ref 6.4–8.2)
PROT SERPL-MCNC: 7 G/DL (ref 6.4–8.2)
RBC # BLD AUTO: 4.75 M/UL (ref 4.2–5.4)
SODIUM SERPL-SCNC: 139 MMOL/L (ref 136–145)
SODIUM SERPL-SCNC: 141 MMOL/L (ref 136–145)
TRIGL SERPL-MCNC: 125 MG/DL
TSH SERPL DL<=0.05 MIU/L-ACNC: 2.22 UIU/ML (ref 0.27–4.2)
WBC OTHER # BLD: 6.9 K/UL (ref 4–10.5)

## 2025-02-21 PROCEDURE — 84443 ASSAY THYROID STIM HORMONE: CPT

## 2025-02-21 PROCEDURE — 82306 VITAMIN D 25 HYDROXY: CPT

## 2025-02-21 PROCEDURE — 80053 COMPREHEN METABOLIC PANEL: CPT

## 2025-02-21 PROCEDURE — 85025 COMPLETE CBC W/AUTO DIFF WBC: CPT

## 2025-02-21 PROCEDURE — 80061 LIPID PANEL: CPT

## 2025-04-22 ENCOUNTER — OFFICE VISIT (OUTPATIENT)
Dept: INTERNAL MEDICINE CLINIC | Age: 20
End: 2025-04-22
Payer: COMMERCIAL

## 2025-04-22 VITALS
DIASTOLIC BLOOD PRESSURE: 78 MMHG | WEIGHT: 156 LBS | BODY MASS INDEX: 28.53 KG/M2 | OXYGEN SATURATION: 98 % | SYSTOLIC BLOOD PRESSURE: 108 MMHG | HEART RATE: 75 BPM

## 2025-04-22 DIAGNOSIS — F32.A DEPRESSIVE DISORDER: Primary | ICD-10-CM

## 2025-04-22 DIAGNOSIS — F90.8 OTHER SPECIFIED ATTENTION DEFICIT HYPERACTIVITY DISORDER (ADHD): ICD-10-CM

## 2025-04-22 PROBLEM — Z13.31 POSITIVE DEPRESSION SCREENING: Status: RESOLVED | Noted: 2019-09-17 | Resolved: 2025-04-22

## 2025-04-22 PROCEDURE — 99213 OFFICE O/P EST LOW 20 MIN: CPT | Performed by: FAMILY MEDICINE

## 2025-04-22 PROCEDURE — G2211 COMPLEX E/M VISIT ADD ON: HCPCS | Performed by: FAMILY MEDICINE

## 2025-04-22 RX ORDER — CITALOPRAM HYDROBROMIDE 10 MG/1
10 TABLET ORAL DAILY
Qty: 30 TABLET | Refills: 3 | Status: SHIPPED | OUTPATIENT
Start: 2025-04-22

## 2025-04-22 ASSESSMENT — ENCOUNTER SYMPTOMS
ABDOMINAL PAIN: 0
SHORTNESS OF BREATH: 0
CHEST TIGHTNESS: 0
SORE THROAT: 0
COLOR CHANGE: 0
CONSTIPATION: 0
DIARRHEA: 0
VOMITING: 0

## 2025-04-22 NOTE — PROGRESS NOTES
Subjective:      Chief Complaint   Patient presents with    Follow-up     2 mth f/u       HPI:  Jessica Motley is a 19 y.o. female who presents today for follow up of chronic conditions as listed below.    Was continued on adderall for ADHD as patient wanted to try taking again.  States she tried taking for another week or two but then discontinued due to worsening depression.      States she would like to try switching to a different anti depressant as she feels it is not as effective as it used to be.  States she can still tell that it helps as she notices a definite difference if she doesn't take it, but is having more difficulty rebounding from her \"low days.\"  Has been on zoloft since 8th grade, has not tried any other mood medications in the past.     States she has not seen her psychologist in several months.    Does still endorse ADHD symptoms, but wants to hold off on trying any other ADHD medications for now.      Labs reviewed.      Past Medical History:   Diagnosis Date    Anxiety     Depression     Dysfunction of left eustachian tube 11/06/2018    Mononucleosis 2015    Non-recurrent acute suppurative otitis media of right ear without spontaneous rupture of tympanic membrane 03/11/2020        Past Surgical History:   Procedure Laterality Date    TONSILLECTOMY AND ADENOIDECTOMY  2006       Social History     Tobacco Use    Smoking status: Never    Smokeless tobacco: Never   Substance Use Topics    Alcohol use: Never        Review of Systems   Constitutional:  Negative for activity change, appetite change, chills, fever and unexpected weight change.   HENT:  Negative for congestion and sore throat.    Respiratory:  Negative for chest tightness and shortness of breath.    Cardiovascular:  Negative for chest pain and palpitations.   Gastrointestinal:  Negative for abdominal pain, constipation, diarrhea and vomiting.   Genitourinary:  Negative for dysuria.   Skin:  Negative for color change.

## 2025-04-22 NOTE — PATIENT INSTRUCTIONS
We are committed to providing you the best care possible.    If you receive a survey after visiting one of our offices, please take time to share your experience concerning your physician office visit.  These surveys are confidential and no health information about you is shared.    We are eager to improve for you and continue to give you satisfactory care, we are counting on your feedback to help make that happen.             Start taking 1/2 tab of your zoloft for the next 1-2 weeks, then discontinue and start 1 tab of celexa daily.  Contact clinic if you have any issues/side effects so we can switch you to something else.    Start taking vitamin D3 (at least 1000 IU daily) over the counter.        We are committed to providing you the best care possible.    If you receive a survey after visiting one of our offices, please take time to share your experience concerning your physician office visit.  These surveys are confidential and no health information about you is shared.    We are eager to improve for you and continue to give you satisfactory care, we are counting on your feedback to help make that happen.

## 2025-05-15 NOTE — PROGRESS NOTES
RHEUMATOLOGY NEW PATIENT VISIT    2025      Patient Name: Jessica Motley  : 2005  Medical Record: [id number]      CHIEF COMPLAINT    NICOLETTE positive  Polyarthralgia    Pertinent Problems  Hx of mononucleosis      HISTORY OF PRESENT ILLNESS    Jessica Motley is a 19 y.o. female who was referred by Kandice Cooper. Symptom onset started in 7th grade when her period started. She had an injury to her right wrist in 5th grade while playing soccer. There is minimal swelling in her wrist but more of pain. There is swelling in both ankles in the a.m. labs showed borderline positive NICOLETTE. Tylenol also helped pain. OCP helped menstrual symptoms that helped her joint pain but it is still ungoing. She describes joint pain in her knees and ankles to be painful and unstable.     Today there is wrist and elbows. Elbow pain has been more recently   Lower back and hip pain is chronic since becoming a teenager  There  is stiffness lasting for an hour   Overall Discomfort: 4/5/10   Swelling: in her ankles  Tolerating medications: Yes  Improved with: stretching helps her back pain   Worse with: resting worsens back pain     Hair loss: Denies  Oral Ulcers: canker sores+   Dry eyes and mouth: Denies  Rashes: scalp rash +  Photosensitivity: Denies   Photophobia: Denies  Raynaud's: pale and blue  Chest Pain: Denies  SOB: Denies  Blood Clots: Denies  Seizures/strokes: Denies  Kidney Disease: denies  Anemia/thrombocytopenia/leukopenia: Denies  Antibodies: NICOLETTE     Current rheum meds: none    Past rheum meds:           REVIEW OF SYSTEMS     Constitutional:  Denies fever or chills, decreased appetite, or weight loss   Eyes:  Denies change in visual acuity or eye dryness or irritation  HENT:  Denies dry mouth or oral ulcers  Respiratory:  Denies cough or shortness of breath   Cardiovascular:  Denies chest pain or edema   GI:  Denies abdominal pain, nausea, vomiting, bloody stools or diarrhea   :  Denies dysuria or

## 2025-05-16 ENCOUNTER — OFFICE VISIT (OUTPATIENT)
Age: 20
End: 2025-05-16
Payer: COMMERCIAL

## 2025-05-16 ENCOUNTER — HOSPITAL ENCOUNTER (OUTPATIENT)
Age: 20
Discharge: HOME | End: 2025-05-16
Payer: COMMERCIAL

## 2025-05-16 VITALS
HEART RATE: 75 BPM | HEIGHT: 62 IN | DIASTOLIC BLOOD PRESSURE: 84 MMHG | OXYGEN SATURATION: 99 % | SYSTOLIC BLOOD PRESSURE: 120 MMHG | BODY MASS INDEX: 29.11 KG/M2 | WEIGHT: 158.2 LBS

## 2025-05-16 DIAGNOSIS — M25.50 POLYARTHRALGIA: ICD-10-CM

## 2025-05-16 DIAGNOSIS — Z01.89 ENCOUNTER FOR OTHER SPECIFIED SPECIAL EXAMINATIONS: ICD-10-CM

## 2025-05-16 DIAGNOSIS — R76.8 ANA POSITIVE: ICD-10-CM

## 2025-05-16 DIAGNOSIS — E55.9 VITAMIN D DEFICIENCY: ICD-10-CM

## 2025-05-16 DIAGNOSIS — R76.8 ANA POSITIVE: Primary | ICD-10-CM

## 2025-05-16 LAB
ALBUMIN SERPL-MCNC: 4.3 G/DL (ref 3.4–5)
ALBUMIN/GLOB SERPL: 1.4 {RATIO} (ref 1.1–2.2)
ALBUMIN: 4.3 G/DL (ref 3.4–5)
ALP SERPL-CCNC: 87 U/L (ref 40–129)
ALT SERPL-CCNC: 9 U/L (ref 10–40)
ANION GAP SERPL CALCULATED.3IONS-SCNC: 12 MMOL/L (ref 9–17)
AST SERPL-CCNC: 17 U/L (ref 15–37)
BILIRUB DIRECT SERPL-MCNC: <0.2 MG/DL (ref 0–0.3)
BILIRUB INDIRECT SERPL-MCNC: ABNORMAL MG/DL (ref 0–0.7)
BILIRUB SERPL-MCNC: <0.2 MG/DL (ref 0–1)
BUN SERPL-MCNC: 8 MG/DL (ref 7–20)
C3 SERPL-MCNC: 189 MG/DL (ref 90–180)
C4 SERPL-MCNC: 32 MG/DL (ref 10–40)
CALCIUM SERPL-MCNC: 9.6 MG/DL (ref 8.3–10.6)
CHLORIDE SERPL-SCNC: 102 MMOL/L (ref 99–110)
CO2 SERPL-SCNC: 23 MMOL/L (ref 21–32)
CREAT SERPL-MCNC: 0.7 MG/DL (ref 0.6–1.1)
CRP SERPL HS-MCNC: 12.1 MG/L (ref 0–5)
ERYTHROCYTE [DISTWIDTH] IN BLOOD BY AUTOMATED COUNT: 13.2 % (ref 11.7–14.9)
ERYTHROCYTE [SEDIMENTATION RATE] IN BLOOD BY WESTERGREN METHOD: 8 MM/HR (ref 0–20)
GFR, ESTIMATED: >90 ML/MIN/1.73M2
GLUCOSE SERPL-MCNC: 80 MG/DL (ref 74–99)
HAV IGM SERPL QL IA: NONREACTIVE
HBV CORE IGM SERPL QL IA: NONREACTIVE
HBV SURFACE AG SERPL QL IA: NONREACTIVE
HCT VFR BLD AUTO: 41.7 % (ref 37–47)
HCV AB SERPL QL IA: NONREACTIVE
HGB BLD-MCNC: 13 G/DL (ref 12.5–16)
MCH RBC QN AUTO: 27.8 PG (ref 27–31)
MCHC RBC AUTO-ENTMCNC: 31.2 G/DL (ref 32–36)
MCV RBC AUTO: 89.1 FL (ref 78–100)
PHOSPHATE SERPL-MCNC: 3.2 MG/DL (ref 2.5–4.9)
PLATELET # BLD AUTO: 428 K/UL (ref 140–440)
PMV BLD AUTO: 10.4 FL (ref 7.5–11.1)
POTASSIUM SERPL-SCNC: 4.2 MMOL/L (ref 3.5–5.1)
PROT SERPL-MCNC: 7.4 G/DL (ref 6.4–8.2)
RBC # BLD AUTO: 4.68 M/UL (ref 4.2–5.4)
SODIUM SERPL-SCNC: 137 MMOL/L (ref 136–145)
WBC OTHER # BLD: 8.1 K/UL (ref 4–10.5)

## 2025-05-16 PROCEDURE — 80074 ACUTE HEPATITIS PANEL: CPT

## 2025-05-16 PROCEDURE — 85652 RBC SED RATE AUTOMATED: CPT

## 2025-05-16 PROCEDURE — 85027 COMPLETE CBC AUTOMATED: CPT

## 2025-05-16 PROCEDURE — 80069 RENAL FUNCTION PANEL: CPT

## 2025-05-16 PROCEDURE — 86431 RHEUMATOID FACTOR QUANT: CPT

## 2025-05-16 PROCEDURE — 86480 TB TEST CELL IMMUN MEASURE: CPT

## 2025-05-16 PROCEDURE — 80076 HEPATIC FUNCTION PANEL: CPT

## 2025-05-16 PROCEDURE — 86200 CCP ANTIBODY: CPT

## 2025-05-16 PROCEDURE — 86160 COMPLEMENT ANTIGEN: CPT

## 2025-05-16 PROCEDURE — 99205 OFFICE O/P NEW HI 60 MIN: CPT | Performed by: STUDENT IN AN ORGANIZED HEALTH CARE EDUCATION/TRAINING PROGRAM

## 2025-05-16 PROCEDURE — 86140 C-REACTIVE PROTEIN: CPT

## 2025-05-16 PROCEDURE — 36415 COLL VENOUS BLD VENIPUNCTURE: CPT

## 2025-05-16 NOTE — PATIENT INSTRUCTIONS
Patient Instructions  Complete ordered labs  We will discuss results at next visit  RTC in 6 weeks

## 2025-05-17 LAB — RHEUMATOID FACTOR: <10 IU/ML

## 2025-05-18 LAB — CYCLIC CITRULLIN PEPTIDE AB: 8 UNITS (ref 0–19)

## 2025-05-19 LAB
QUANTI TB GOLD PLUS: NEGATIVE
QUANTI TB1 MINUS NIL: 0 IU/ML
QUANTI TB2 MINUS NIL: 0 IU/ML
QUANTIFERON MITOGEN: 9.96 IU/ML
QUANTIFERON NIL: 0.04 IU/ML

## 2025-05-29 ENCOUNTER — TELEPHONE (OUTPATIENT)
Age: 20
End: 2025-05-29

## 2025-05-29 NOTE — TELEPHONE ENCOUNTER
----- Message from Dr. Pat Roper MD sent at 5/29/2025  5:03 AM EDT -----  Please notify patient that her labs suggest rheumatoid arthritis.  Unfortunately we do not have any available appointments to bring her in at this time to discuss her results.  I have ordered Plaquenil.  Patient should take 1.5 tablets daily and keep her future appointment w/me.  We will go over her results in detail at her next visit.        Left message for patient to return call for results

## 2025-06-02 DIAGNOSIS — M06.09 RHEUMATOID ARTHRITIS OF MULTIPLE SITES WITH NEGATIVE RHEUMATOID FACTOR (HCC): ICD-10-CM

## 2025-06-02 RX ORDER — HYDROXYCHLOROQUINE SULFATE 200 MG/1
TABLET, FILM COATED ORAL
Qty: 135 TABLET | Refills: 0 | Status: SHIPPED | OUTPATIENT
Start: 2025-06-02

## 2025-06-12 ENCOUNTER — OFFICE VISIT (OUTPATIENT)
Dept: INTERNAL MEDICINE CLINIC | Age: 20
End: 2025-06-12
Payer: COMMERCIAL

## 2025-06-12 VITALS
DIASTOLIC BLOOD PRESSURE: 68 MMHG | WEIGHT: 159.2 LBS | OXYGEN SATURATION: 99 % | RESPIRATION RATE: 18 BRPM | SYSTOLIC BLOOD PRESSURE: 118 MMHG | BODY MASS INDEX: 29.3 KG/M2 | HEIGHT: 62 IN | HEART RATE: 90 BPM

## 2025-06-12 DIAGNOSIS — F32.A DEPRESSIVE DISORDER: Primary | ICD-10-CM

## 2025-06-12 DIAGNOSIS — Z86.69 HISTORY OF RECURRENT EAR INFECTION: ICD-10-CM

## 2025-06-12 PROCEDURE — 99213 OFFICE O/P EST LOW 20 MIN: CPT | Performed by: FAMILY MEDICINE

## 2025-06-12 PROCEDURE — G2211 COMPLEX E/M VISIT ADD ON: HCPCS | Performed by: FAMILY MEDICINE

## 2025-06-12 RX ORDER — CITALOPRAM HYDROBROMIDE 20 MG/1
20 TABLET ORAL DAILY
Qty: 30 TABLET | Refills: 3 | Status: SHIPPED | OUTPATIENT
Start: 2025-06-12

## 2025-06-12 ASSESSMENT — ENCOUNTER SYMPTOMS
CHEST TIGHTNESS: 0
SHORTNESS OF BREATH: 0
COLOR CHANGE: 0
VOMITING: 0
ABDOMINAL PAIN: 0
SORE THROAT: 0
CONSTIPATION: 0
DIARRHEA: 0

## 2025-06-12 NOTE — PROGRESS NOTES
Subjective:      Chief Complaint   Patient presents with    Follow-up     6 wk follow up       HPI:  Jessica Motley is a 19 y.o. female who presents today for medication follow up.     Was switched to celexa at her last appointment.  States she has noticed improvement in symptoms with celexa, but still feels she is having mood swings and could use a higher dose.  Does feel she is less anxious and can concentrate better and is more productive on the medication.      No issues with side effects.      States she also has had several ear infections in the past few years and was wondering if she could be referred to ENT.     No other concerns today.         Past Medical History:   Diagnosis Date    Anxiety     Depression     Dysfunction of left eustachian tube 11/06/2018    Mononucleosis 2015    Non-recurrent acute suppurative otitis media of right ear without spontaneous rupture of tympanic membrane 03/11/2020        Past Surgical History:   Procedure Laterality Date    TONSILLECTOMY AND ADENOIDECTOMY  2006       Social History     Tobacco Use    Smoking status: Never    Smokeless tobacco: Never   Substance Use Topics    Alcohol use: Never        Review of Systems   Constitutional:  Negative for activity change, appetite change, chills, fever and unexpected weight change.   HENT:  Negative for congestion and sore throat.    Respiratory:  Negative for chest tightness and shortness of breath.    Cardiovascular:  Negative for chest pain and palpitations.   Gastrointestinal:  Negative for abdominal pain, constipation, diarrhea and vomiting.   Genitourinary:  Negative for dysuria.   Skin:  Negative for color change.   Neurological:  Negative for dizziness and light-headedness.   Psychiatric/Behavioral:  Positive for dysphoric mood. Negative for self-injury and suicidal ideas. The patient is not nervous/anxious.         Prior to Visit Medications    Medication Sig Taking? Authorizing Provider   hydroxychloroquine

## 2025-06-22 NOTE — PROGRESS NOTES
RHEUMATOLOGY FOLLOW UP VISIT    2025      Patient Name: Jessica Motley  : 2005  Medical Record: [id number]      CHIEF COMPLAINT    NICOLETTE positive  Inflammatory polyrthralgia    Pertinent Problems  Hx of mononucleosis      HISTORY OF PRESENT ILLNESS    Jessica Motley is a 19 y.o. female who established on 2025. Symptom onset started in 7th grade when her period started. She had an injury to her right wrist in 5th grade while playing soccer. There is minimal swelling in her wrist but more of pain. There is swelling in both ankles in the a.m. labs showed borderline positive NICOLETTE. Tylenol also helped pain. OCP helped menstrual symptoms that helped her joint pain but it is still ungoing. She describes joint pain in her knees and ankles to be painful and unstable.       LCV: 2025  I reviewed exagen report performed on 2025  NICOLETTE by IFA neg   Anti-dsDNA by IgG confirmation by crithidia negative  Other subserologies neg   Low likelihood for lupus  ALT 9 L   AST, ALP normal  Creatinine normal  CRP 12.1 H  C3 189H  C4 normal  WBC, hemoglobin, platelets normal  CCP normal  RF <10 normal  Creatinine normal      Subjective  Today there is wrist and elbows. Elbow pain has been more recently   Lower back and hip pain is chronic since becoming a teenager  There  is stiffness lasting for an hour   Overall Discomfort: 4/5/10   Swelling: in her ankles  Tolerating medications: Yes  Improved with: stretching helps her back pain   Worse with: resting worsens back pain     Hair loss: Denies  Oral Ulcers: canker sores+   Dry eyes and mouth: Denies  Rashes: scalp rash +  Photosensitivity: Denies   Photophobia: Denies  Raynaud's: pale and blue  Chest Pain: Denies  SOB: Denies  Blood Clots: Denies  Seizures/strokes: Denies  Kidney Disease: denies  Anemia/thrombocytopenia/leukopenia: Denies  Antibodies: NICOLETTE     Current rheum meds: none    Past rheum meds:           REVIEW OF SYSTEMS     Constitutional:  Denies

## 2025-06-26 ENCOUNTER — OFFICE VISIT (OUTPATIENT)
Age: 20
End: 2025-06-26
Payer: COMMERCIAL

## 2025-06-26 VITALS
HEART RATE: 81 BPM | OXYGEN SATURATION: 97 % | WEIGHT: 160.4 LBS | DIASTOLIC BLOOD PRESSURE: 78 MMHG | BODY MASS INDEX: 29.34 KG/M2 | SYSTOLIC BLOOD PRESSURE: 116 MMHG

## 2025-06-26 DIAGNOSIS — Z79.899 ENCOUNTER FOR MONITORING OF HYDROXYCHLOROQUINE THERAPY: ICD-10-CM

## 2025-06-26 DIAGNOSIS — M06.4 INFLAMMATORY POLYARTHRITIS (HCC): Primary | ICD-10-CM

## 2025-06-26 DIAGNOSIS — R79.82 ELEVATED C-REACTIVE PROTEIN (CRP): ICD-10-CM

## 2025-06-26 DIAGNOSIS — Z51.81 ENCOUNTER FOR MONITORING OF HYDROXYCHLOROQUINE THERAPY: ICD-10-CM

## 2025-06-26 PROCEDURE — 99214 OFFICE O/P EST MOD 30 MIN: CPT | Performed by: STUDENT IN AN ORGANIZED HEALTH CARE EDUCATION/TRAINING PROGRAM

## 2025-06-26 NOTE — PATIENT INSTRUCTIONS
We are committed to providing you the best care possible.    If you receive a survey after visiting one of our offices, please take time to share your experience concerning your physician office visit.  These surveys are confidential and no health information about you is shared.    We are eager to improve for you and we are counting on your feedback to help make that happen.    Patient Instructions  Start Plaquenil   Repeat CRP one week prior to your next appointment   RTC in 2 months

## 2025-08-14 ENCOUNTER — OFFICE VISIT (OUTPATIENT)
Dept: PSYCHOLOGY | Age: 20
End: 2025-08-14
Payer: COMMERCIAL

## 2025-08-14 DIAGNOSIS — F90.2 ADHD (ATTENTION DEFICIT HYPERACTIVITY DISORDER), COMBINED TYPE: Primary | ICD-10-CM

## 2025-08-14 DIAGNOSIS — F33.41 RECURRENT MAJOR DEPRESSIVE DISORDER, IN PARTIAL REMISSION: ICD-10-CM

## 2025-08-14 PROCEDURE — 90832 PSYTX W PT 30 MINUTES: CPT | Performed by: PSYCHOLOGIST

## 2025-08-22 RX ORDER — SERTRALINE HYDROCHLORIDE 100 MG/1
TABLET, FILM COATED ORAL
COMMUNITY

## 2025-08-22 RX ORDER — LEVONORGESTREL/ETHIN.ESTRADIOL 0.1-0.02MG
TABLET ORAL
COMMUNITY

## 2025-08-27 ENCOUNTER — HOSPITAL ENCOUNTER (OUTPATIENT)
Dept: LAB | Age: 20
Discharge: HOME | End: 2025-08-27
Payer: COMMERCIAL

## 2025-08-27 DIAGNOSIS — M06.4 INFLAMMATORY POLYARTHRITIS (HCC): ICD-10-CM

## 2025-08-27 DIAGNOSIS — R79.82 ELEVATED C-REACTIVE PROTEIN (CRP): ICD-10-CM

## 2025-08-27 LAB
C3 SERPL-MCNC: 186 MG/DL (ref 90–180)
CRP SERPL HS-MCNC: 15.4 MG/L (ref 0–5)

## 2025-08-27 PROCEDURE — 86140 C-REACTIVE PROTEIN: CPT

## 2025-08-27 PROCEDURE — 86160 COMPLEMENT ANTIGEN: CPT

## 2025-09-04 ENCOUNTER — OFFICE VISIT (OUTPATIENT)
Age: 20
End: 2025-09-04
Payer: COMMERCIAL

## 2025-09-04 VITALS
BODY MASS INDEX: 30 KG/M2 | HEART RATE: 78 BPM | SYSTOLIC BLOOD PRESSURE: 114 MMHG | DIASTOLIC BLOOD PRESSURE: 68 MMHG | WEIGHT: 164 LBS

## 2025-09-04 DIAGNOSIS — E55.9 VITAMIN D DEFICIENCY: ICD-10-CM

## 2025-09-04 DIAGNOSIS — Z51.81 ENCOUNTER FOR MONITORING OF HYDROXYCHLOROQUINE THERAPY: ICD-10-CM

## 2025-09-04 DIAGNOSIS — Z79.899 ENCOUNTER FOR MONITORING OF HYDROXYCHLOROQUINE THERAPY: ICD-10-CM

## 2025-09-04 DIAGNOSIS — M06.4 INFLAMMATORY POLYARTHRITIS (HCC): Primary | ICD-10-CM

## 2025-09-04 DIAGNOSIS — R79.82 ELEVATED C-REACTIVE PROTEIN (CRP): ICD-10-CM

## 2025-09-04 PROCEDURE — 99214 OFFICE O/P EST MOD 30 MIN: CPT | Performed by: STUDENT IN AN ORGANIZED HEALTH CARE EDUCATION/TRAINING PROGRAM

## 2025-09-04 RX ORDER — HYDROXYCHLOROQUINE SULFATE 200 MG/1
TABLET, FILM COATED ORAL
Qty: 135 TABLET | Refills: 0 | Status: SHIPPED | OUTPATIENT
Start: 2025-09-04

## 2025-09-04 RX ORDER — PREDNISONE 10 MG/1
10 TABLET ORAL DAILY
Qty: 35 TABLET | Refills: 0 | Status: SHIPPED | OUTPATIENT
Start: 2025-09-04 | End: 2025-10-09